# Patient Record
Sex: MALE | Race: WHITE | NOT HISPANIC OR LATINO
[De-identification: names, ages, dates, MRNs, and addresses within clinical notes are randomized per-mention and may not be internally consistent; named-entity substitution may affect disease eponyms.]

---
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
## 2022-12-29 ENCOUNTER — NON-APPOINTMENT (OUTPATIENT)
Age: 22
End: 2022-12-29

## 2023-01-18 ENCOUNTER — NON-APPOINTMENT (OUTPATIENT)
Age: 23
End: 2023-01-18

## 2023-01-19 ENCOUNTER — NON-APPOINTMENT (OUTPATIENT)
Age: 23
End: 2023-01-19

## 2023-01-19 ENCOUNTER — APPOINTMENT (OUTPATIENT)
Dept: FAMILY MEDICINE | Facility: CLINIC | Age: 23
End: 2023-01-19
Payer: MEDICAID

## 2023-01-19 VITALS
OXYGEN SATURATION: 98 % | TEMPERATURE: 97.6 F | WEIGHT: 182 LBS | SYSTOLIC BLOOD PRESSURE: 124 MMHG | BODY MASS INDEX: 24.65 KG/M2 | DIASTOLIC BLOOD PRESSURE: 80 MMHG | HEART RATE: 75 BPM | HEIGHT: 72 IN

## 2023-01-19 DIAGNOSIS — Z23 ENCOUNTER FOR IMMUNIZATION: ICD-10-CM

## 2023-01-19 DIAGNOSIS — F51.02 ADJUSTMENT INSOMNIA: ICD-10-CM

## 2023-01-19 DIAGNOSIS — F41.9 ANXIETY DISORDER, UNSPECIFIED: ICD-10-CM

## 2023-01-19 PROCEDURE — G0008: CPT

## 2023-01-19 PROCEDURE — 99203 OFFICE O/P NEW LOW 30 MIN: CPT | Mod: 25

## 2023-01-19 PROCEDURE — 90686 IIV4 VACC NO PRSV 0.5 ML IM: CPT

## 2023-01-19 NOTE — HISTORY OF PRESENT ILLNESS
[FreeTextEntry1] : Leaving pediatrician [de-identified] : Patient is a beulah in college.  Has some anxiety issues speaks to a therapist weekly has been on Zoloft in the past which made his anxiety worse.  He is able to deal with it he is unhappy with his roommates college they partied too much he cannot sleep talked about Benadryl and or melatonin.  He sleeps 4 hours a night at college and 8 hours a night when at home.  He is locked into a lease until May\par \par Weight lifts several times a week encouraged to incorporate aerobic training\par \par Does not smoke drinks rarely has a steady girlfriend monogamous no STD worries\par \par Uses creatine and protein shakes no steroids advised to keep his protein to less than 60 g a day

## 2023-01-19 NOTE — HEALTH RISK ASSESSMENT
[Excellent] : ~his/her~ current health as excellent [Good] : ~his/her~  mood as  good [Never] : Never [Yes] : Yes [No falls in past year] : Patient reported no falls in the past year [PHQ-2 Negative - No further assessment needed] : PHQ-2 Negative - No further assessment needed [de-identified] : Less than once a week [Change in mental status noted] : No change in mental status noted [Student] : student [Single] : single [Fully functional (bathing, dressing, toileting, transferring, walking, feeding)] : Fully functional (bathing, dressing, toileting, transferring, walking, feeding) [Fully functional (using the telephone, shopping, preparing meals, housekeeping, doing laundry, using] : Fully functional and needs no help or supervision to perform IADLs (using the telephone, shopping, preparing meals, housekeeping, doing laundry, using transportation, managing medications and managing finances) [Reports changes in hearing] : Reports no changes in hearing [Seat Belt] :  uses seat belt

## 2023-01-19 NOTE — ASSESSMENT
[FreeTextEntry1] : Well male exercise encouraged continue with online therapist flu shot given see me as needed

## 2023-01-19 NOTE — PHYSICAL EXAM
[No Edema] : there was no peripheral edema [No Hernias] : no hernias [Normal] : soft, non-tender, non-distended, no masses palpated, no HSM and normal bowel sounds [Penis Abnormality] : normal circumcised penis [Testes Mass (___cm)] : there were no testicular masses

## 2023-01-20 ENCOUNTER — MED ADMIN CHARGE (OUTPATIENT)
Age: 23
End: 2023-01-20

## 2023-01-21 LAB
ALBUMIN SERPL ELPH-MCNC: 4.9 G/DL
ALP BLD-CCNC: 106 U/L
ALT SERPL-CCNC: 39 U/L
ANION GAP SERPL CALC-SCNC: 12 MMOL/L
AST SERPL-CCNC: 42 U/L
BILIRUB SERPL-MCNC: 0.4 MG/DL
BUN SERPL-MCNC: 22 MG/DL
CALCIUM SERPL-MCNC: 9.9 MG/DL
CHLORIDE SERPL-SCNC: 103 MMOL/L
CHOLEST SERPL-MCNC: 164 MG/DL
CO2 SERPL-SCNC: 27 MMOL/L
CREAT SERPL-MCNC: 0.98 MG/DL
EGFR: 113 ML/MIN/1.73M2
GLUCOSE SERPL-MCNC: 82 MG/DL
HDLC SERPL-MCNC: 52 MG/DL
LDLC SERPL CALC-MCNC: 98 MG/DL
NONHDLC SERPL-MCNC: 112 MG/DL
POTASSIUM SERPL-SCNC: 4.2 MMOL/L
PROT SERPL-MCNC: 7.2 G/DL
SODIUM SERPL-SCNC: 142 MMOL/L
TRIGL SERPL-MCNC: 69 MG/DL
TSH SERPL-ACNC: 1.42 UIU/ML

## 2024-01-19 ENCOUNTER — APPOINTMENT (OUTPATIENT)
Dept: FAMILY MEDICINE | Facility: CLINIC | Age: 24
End: 2024-01-19
Payer: MEDICAID

## 2024-01-19 VITALS
HEART RATE: 79 BPM | SYSTOLIC BLOOD PRESSURE: 110 MMHG | TEMPERATURE: 97.9 F | OXYGEN SATURATION: 98 % | DIASTOLIC BLOOD PRESSURE: 60 MMHG | HEIGHT: 72 IN | WEIGHT: 204 LBS | BODY MASS INDEX: 27.63 KG/M2

## 2024-01-19 DIAGNOSIS — Z00.00 ENCOUNTER FOR GENERAL ADULT MEDICAL EXAMINATION W/OUT ABNORMAL FINDINGS: ICD-10-CM

## 2024-01-19 PROCEDURE — 90686 IIV4 VACC NO PRSV 0.5 ML IM: CPT

## 2024-01-19 PROCEDURE — G0008: CPT

## 2024-01-19 PROCEDURE — 99395 PREV VISIT EST AGE 18-39: CPT | Mod: 25

## 2024-01-19 RX ORDER — KETOCONAZOLE 20.5 MG/ML
2 SHAMPOO, SUSPENSION TOPICAL DAILY
Qty: 2 | Refills: 3 | Status: ACTIVE | COMMUNITY
Start: 2024-01-19 | End: 1900-01-01

## 2024-01-19 NOTE — ASSESSMENT
[FreeTextEntry1] : Ketoconazole for dandruff  Recheck LFTs AST was 42 last year  Sleeping much better with new college roommates

## 2024-01-19 NOTE — HEALTH RISK ASSESSMENT
[Excellent] : ~his/her~  mood as  excellent [No] : No [PHQ-2 Negative - No further assessment needed] : PHQ-2 Negative - No further assessment needed [Change in mental status noted] : No change in mental status noted [Fully functional (bathing, dressing, toileting, transferring, walking, feeding)] : Fully functional (bathing, dressing, toileting, transferring, walking, feeding) [Fully functional (using the telephone, shopping, preparing meals, housekeeping, doing laundry, using] : Fully functional and needs no help or supervision to perform IADLs (using the telephone, shopping, preparing meals, housekeeping, doing laundry, using transportation, managing medications and managing finances)

## 2024-01-19 NOTE — PHYSICAL EXAM
[Normal] : soft, non-tender, non-distended, no masses palpated, no HSM and normal bowel sounds [Penis Abnormality] : normal circumcised penis [Testes Mass (___cm)] : there were no testicular masses [de-identified] : Dandruff and slight seborrheic dermatitis of face

## 2024-01-19 NOTE — HISTORY OF PRESENT ILLNESS
[de-identified] : Here for checkup.  Has no complaints.  Exercises daily.  Uses creatine 2 g a day no other supplements no testosterone.  Does not drink does not smoke  Severe dandruff over-the-counter shampoos ineffective  Sexually active with 1 partner no STD issues  Senior at college in [employer]

## 2024-01-20 LAB
ALBUMIN SERPL ELPH-MCNC: 4.6 G/DL
ALP BLD-CCNC: 118 U/L
ALT SERPL-CCNC: 38 U/L
ANION GAP SERPL CALC-SCNC: 12 MMOL/L
AST SERPL-CCNC: 35 U/L
BILIRUB SERPL-MCNC: 0.3 MG/DL
BUN SERPL-MCNC: 25 MG/DL
CALCIUM SERPL-MCNC: 9.5 MG/DL
CHLORIDE SERPL-SCNC: 107 MMOL/L
CO2 SERPL-SCNC: 26 MMOL/L
CREAT SERPL-MCNC: 1 MG/DL
EGFR: 110 ML/MIN/1.73M2
GLUCOSE SERPL-MCNC: 59 MG/DL
POTASSIUM SERPL-SCNC: 4.3 MMOL/L
PROT SERPL-MCNC: 6.4 G/DL
SODIUM SERPL-SCNC: 145 MMOL/L

## 2024-09-26 ENCOUNTER — APPOINTMENT (OUTPATIENT)
Dept: FAMILY MEDICINE | Facility: CLINIC | Age: 24
End: 2024-09-26
Payer: MEDICAID

## 2024-09-26 VITALS
TEMPERATURE: 96.7 F | HEIGHT: 72 IN | DIASTOLIC BLOOD PRESSURE: 64 MMHG | BODY MASS INDEX: 27.5 KG/M2 | RESPIRATION RATE: 16 BRPM | WEIGHT: 203 LBS | SYSTOLIC BLOOD PRESSURE: 108 MMHG | HEART RATE: 62 BPM | OXYGEN SATURATION: 98 %

## 2024-09-26 DIAGNOSIS — S46.319A STRAIN OF MUSCLE, FASCIA AND TENDON OF TRICEPS, UNSPECIFIED ARM, INITIAL ENCOUNTER: ICD-10-CM

## 2024-09-26 DIAGNOSIS — Z78.9 OTHER SPECIFIED HEALTH STATUS: ICD-10-CM

## 2024-09-26 PROCEDURE — 99213 OFFICE O/P EST LOW 20 MIN: CPT

## 2024-09-26 NOTE — HISTORY OF PRESENT ILLNESS
[FreeTextEntry8] : 24M here for R triceps pain starting yesterday. Patient was finishing a deadlift, and upon setting the barbell down noticed he had pain in his R tricep. He continued his sets and other exercises (rows, pulldowns), and this morning woke up in more pain than yesterday. He is able to perform his daily activities without issue, but has pain in his R tricep when he lifts up his R arm above behind his head. Otherwise no weakness or concern.

## 2024-09-26 NOTE — PHYSICAL EXAM
[No Acute Distress] : no acute distress [Well Nourished] : well nourished [Well Developed] : well developed [Normal] : soft, non-tender, non-distended, no masses palpated, no HSM and normal bowel sounds [Normal Posterior Cervical Nodes] : no posterior cervical lymphadenopathy [Normal Anterior Cervical Nodes] : no anterior cervical lymphadenopathy [No Rash] : no rash [Normal Gait] : normal gait [Normal Affect] : the affect was normal [Normal Insight/Judgement] : insight and judgment were intact [de-identified] : Tenderness to R mid triceps. Negative Neers, Felipe, Empty can. No rotator cuff tenderness. No elbow tenderness. Full strength and ROM.

## 2024-09-26 NOTE — ASSESSMENT
[FreeTextEntry1] : .  Nonconcerning hx and physical exam likely triceps strain. No weakness of the rotator cuff appreciated.  Advised stretches as tolerated, ice, NSAIDs, tylenol. Rest without lifting for 1 week then gradual return with lower weights as strength allows.  Pt requesting Orthopedic surgery referral, will send.

## 2024-09-30 ENCOUNTER — APPOINTMENT (OUTPATIENT)
Dept: ORTHOPEDIC SURGERY | Facility: CLINIC | Age: 24
End: 2024-09-30
Payer: MEDICAID

## 2024-09-30 VITALS — HEIGHT: 72 IN | BODY MASS INDEX: 27.5 KG/M2 | WEIGHT: 203 LBS

## 2024-09-30 DIAGNOSIS — S46.311A STRAIN OF MUSCLE, FASCIA AND TENDON OF TRICEPS, RIGHT ARM, INITIAL ENCOUNTER: ICD-10-CM

## 2024-09-30 DIAGNOSIS — S29.012A STRAIN OF MUSCLE AND TENDON OF BACK WALL OF THORAX, INITIAL ENCOUNTER: ICD-10-CM

## 2024-09-30 PROCEDURE — 73030 X-RAY EXAM OF SHOULDER: CPT | Mod: 26,RT

## 2024-09-30 PROCEDURE — 99204 OFFICE O/P NEW MOD 45 MIN: CPT | Mod: 25

## 2024-09-30 RX ORDER — MELOXICAM 15 MG/1
15 TABLET ORAL
Qty: 21 | Refills: 0 | Status: ACTIVE | COMMUNITY
Start: 2024-09-30 | End: 1900-01-01

## 2024-09-30 NOTE — DISCUSSION/SUMMARY
[de-identified] : We had a thorough discussion regarding the nature of his pain, the pathophysiology, as well as all treatment options. I discussed operative and non-operative treatment modalities. Patient's presentation is consistent with latissimus dorsi strain. Conservative measures of treatment include rest until asymptomatic, activity avoidance, application to ice to the area 2-3x daily for 20 minutes, with gradual return to activities. Patient was given prescription of formal physical therapy that he will perform 2x/wk for 6-8 wks. A prescription of Meloxicam was given to be taken as directed with food to prevent GI upset, if occurs pt to D/C and call us at that time. All questions were answered and the patient verbalized understanding. The patient is in agreement with this treatment plan. Patient will follow up in 4 wks for repeat clinical assessment.

## 2024-09-30 NOTE — ADDENDUM
[FreeTextEntry1] : Documented by Daysi Mathew acting as a scribe for Dr. Chaves and Gautam Palmer PA-C on 09/30/2024. All medical record entries made by the Scribe were at my, Dr. Chaves, and Gautam Palmer's, direction and personally dictated by me on 09/30/2024. I have reviewed the chart and agree that the record accurately reflects my personal performance of the history, physical exam, procedure and imaging.

## 2024-09-30 NOTE — CONSULT LETTER
[Dear  ___] : Dear  [unfilled], [Consult Letter:] : I had the pleasure of evaluating your patient, [unfilled]. [Please see my note below.] : Please see my note below. [Sincerely,] : Sincerely, [FreeTextEntry3] : Dr. Celestine Chaves

## 2024-09-30 NOTE — HISTORY OF PRESENT ILLNESS
[de-identified] : CORTEZ is a 24 year male here today for initial visit due to R upper extremity injury. Patient states he was deadlifting and felt weird pain in his triceps after getting up from the rep. He then stopped because he felt he injured himself. Injury occurred on 09/25/2024. Says pain has improved but he is having difficulty with range of motion overhead. Denies shoulder pain. Denies difficulties breathing. Denies numbness/tingling.

## 2024-09-30 NOTE — PHYSICAL EXAM
[de-identified] : General: Well appearing, no acute distress Neurologic: A&Ox3, No focal deficits Head: NCAT without abrasions, lacerations, or ecchymosis to head, face, or scalp Eyes: No scleral icterus, no gross abnormalities Respiratory: Equal chest wall expansion bilaterally, no accessory muscle use Lymphatic: No lymphadenopathy palpated Skin: Warm and dry Psychiatric: Normal mood and affect   Examination of the right shoulder reveals no obvious deformity.  There is no evidence of muscle atrophy.  The skin is intact.  There is no swelling, erythema, or ecchymosis. Tender over trifurcation of the proximal triceps. The patient is nontender to palpation throughout the right shoulder including the anterior aspect of the shoulder near the bicipital groove, a.c. joint, trapezius, rhomboids, infraspinatus, and posterior shoulder.  The patient's active range of motion is as follows: Forward flexion to 175 degrees external rotation to 60 degrees and internal rotation to T7.  The patient has 5 out of 5 strength to forward flexion with pronation, internal and external rotation.  The patient has negative Felipe and Neer's tests.  There is no pain with cross body abduction testing.  There is a negative a.c. compression test and no sulcus sign present.  There is a negative liftoff test and negative yergason test.  The compartments of the arm are soft and nontender without evidence of DVT or compartment syndrome.  The patient is grossly neurovascularly intact distally. [de-identified] : The following radiographs were ordered and read by me during this patient's visit. I reviewed each radiograph in detail with the patient and discussed the findings as highlighted below. 4 views of the right shoulder were obtained today that show no fracture, dislocation. There is no degenerative change seen. There is no malalignment. No obvious osseous abnormality. Otherwise unremarkable.

## 2024-09-30 NOTE — HISTORY OF PRESENT ILLNESS
[de-identified] : CORTEZ is a 24 year male here today for initial visit due to R upper extremity injury. Patient states he was deadlifting and felt weird pain in his triceps after getting up from the rep. He then stopped because he felt he injured himself. Injury occurred on 09/25/2024. Says pain has improved but he is having difficulty with range of motion overhead. Denies shoulder pain. Denies difficulties breathing. Denies numbness/tingling.

## 2024-09-30 NOTE — DISCUSSION/SUMMARY
[de-identified] : We had a thorough discussion regarding the nature of his pain, the pathophysiology, as well as all treatment options. I discussed operative and non-operative treatment modalities. Patient's presentation is consistent with latissimus dorsi strain. Conservative measures of treatment include rest until asymptomatic, activity avoidance, application to ice to the area 2-3x daily for 20 minutes, with gradual return to activities. Patient was given prescription of formal physical therapy that he will perform 2x/wk for 6-8 wks. A prescription of Meloxicam was given to be taken as directed with food to prevent GI upset, if occurs pt to D/C and call us at that time. All questions were answered and the patient verbalized understanding. The patient is in agreement with this treatment plan. Patient will follow up in 4 wks for repeat clinical assessment.

## 2024-09-30 NOTE — PHYSICAL EXAM
[de-identified] : General: Well appearing, no acute distress Neurologic: A&Ox3, No focal deficits Head: NCAT without abrasions, lacerations, or ecchymosis to head, face, or scalp Eyes: No scleral icterus, no gross abnormalities Respiratory: Equal chest wall expansion bilaterally, no accessory muscle use Lymphatic: No lymphadenopathy palpated Skin: Warm and dry Psychiatric: Normal mood and affect   Examination of the right shoulder reveals no obvious deformity.  There is no evidence of muscle atrophy.  The skin is intact.  There is no swelling, erythema, or ecchymosis. Tender over trifurcation of the proximal triceps. The patient is nontender to palpation throughout the right shoulder including the anterior aspect of the shoulder near the bicipital groove, a.c. joint, trapezius, rhomboids, infraspinatus, and posterior shoulder.  The patient's active range of motion is as follows: Forward flexion to 175 degrees external rotation to 60 degrees and internal rotation to T7.  The patient has 5 out of 5 strength to forward flexion with pronation, internal and external rotation.  The patient has negative Felipe and Neer's tests.  There is no pain with cross body abduction testing.  There is a negative a.c. compression test and no sulcus sign present.  There is a negative liftoff test and negative yergason test.  The compartments of the arm are soft and nontender without evidence of DVT or compartment syndrome.  The patient is grossly neurovascularly intact distally. [de-identified] : The following radiographs were ordered and read by me during this patient's visit. I reviewed each radiograph in detail with the patient and discussed the findings as highlighted below. 4 views of the right shoulder were obtained today that show no fracture, dislocation. There is no degenerative change seen. There is no malalignment. No obvious osseous abnormality. Otherwise unremarkable.

## 2024-12-19 ENCOUNTER — APPOINTMENT (OUTPATIENT)
Dept: FAMILY MEDICINE | Facility: CLINIC | Age: 24
End: 2024-12-19
Payer: MEDICAID

## 2024-12-19 VITALS
WEIGHT: 207 LBS | HEIGHT: 72 IN | DIASTOLIC BLOOD PRESSURE: 68 MMHG | TEMPERATURE: 98 F | OXYGEN SATURATION: 98 % | BODY MASS INDEX: 28.04 KG/M2 | HEART RATE: 71 BPM | SYSTOLIC BLOOD PRESSURE: 110 MMHG

## 2024-12-19 DIAGNOSIS — M54.42 LUMBAGO WITH SCIATICA, LEFT SIDE: ICD-10-CM

## 2024-12-19 PROCEDURE — 99214 OFFICE O/P EST MOD 30 MIN: CPT

## 2024-12-19 RX ORDER — METHYLPREDNISOLONE 4 MG/1
4 TABLET ORAL
Qty: 1 | Refills: 1 | Status: ACTIVE | COMMUNITY
Start: 2024-12-19 | End: 1900-01-01

## 2024-12-19 RX ORDER — CYCLOBENZAPRINE HYDROCHLORIDE 10 MG/1
10 TABLET, FILM COATED ORAL
Qty: 1 | Refills: 0 | Status: ACTIVE | COMMUNITY
Start: 2024-12-19 | End: 1900-01-01

## 2024-12-26 ENCOUNTER — APPOINTMENT (OUTPATIENT)
Dept: MRI IMAGING | Facility: HOSPITAL | Age: 24
End: 2024-12-26

## 2025-01-09 DIAGNOSIS — M54.42 LUMBAGO WITH SCIATICA, LEFT SIDE: ICD-10-CM

## 2025-01-15 ENCOUNTER — NON-APPOINTMENT (OUTPATIENT)
Age: 25
End: 2025-01-15

## 2025-01-15 ENCOUNTER — APPOINTMENT (OUTPATIENT)
Dept: FAMILY MEDICINE | Facility: CLINIC | Age: 25
End: 2025-01-15
Payer: MEDICAID

## 2025-01-15 VITALS
DIASTOLIC BLOOD PRESSURE: 74 MMHG | OXYGEN SATURATION: 99 % | HEART RATE: 80 BPM | SYSTOLIC BLOOD PRESSURE: 114 MMHG | BODY MASS INDEX: 28.31 KG/M2 | WEIGHT: 209 LBS | TEMPERATURE: 97.8 F | HEIGHT: 72 IN

## 2025-01-15 DIAGNOSIS — R29.898 OTHER SYMPTOMS AND SIGNS INVOLVING THE MUSCULOSKELETAL SYSTEM: ICD-10-CM

## 2025-01-15 DIAGNOSIS — M53.86 OTHER SPECIFIED DORSOPATHIES, LUMBAR REGION: ICD-10-CM

## 2025-01-15 DIAGNOSIS — S29.012A STRAIN OF MUSCLE AND TENDON OF BACK WALL OF THORAX, INITIAL ENCOUNTER: ICD-10-CM

## 2025-01-15 DIAGNOSIS — S46.311A STRAIN OF MUSCLE, FASCIA AND TENDON OF TRICEPS, RIGHT ARM, INITIAL ENCOUNTER: ICD-10-CM

## 2025-01-15 DIAGNOSIS — S46.319A STRAIN OF MUSCLE, FASCIA AND TENDON OF TRICEPS, UNSPECIFIED ARM, INITIAL ENCOUNTER: ICD-10-CM

## 2025-01-15 PROCEDURE — 99213 OFFICE O/P EST LOW 20 MIN: CPT

## 2025-01-20 ENCOUNTER — OUTPATIENT (OUTPATIENT)
Dept: OUTPATIENT SERVICES | Facility: HOSPITAL | Age: 25
LOS: 1 days | End: 2025-01-20
Payer: MEDICAID

## 2025-01-20 ENCOUNTER — APPOINTMENT (OUTPATIENT)
Dept: RADIOLOGY | Facility: CLINIC | Age: 25
End: 2025-01-20
Payer: MEDICAID

## 2025-01-20 DIAGNOSIS — R29.898 OTHER SYMPTOMS AND SIGNS INVOLVING THE MUSCULOSKELETAL SYSTEM: ICD-10-CM

## 2025-01-20 DIAGNOSIS — M53.86 OTHER SPECIFIED DORSOPATHIES, LUMBAR REGION: ICD-10-CM

## 2025-01-20 PROCEDURE — 72100 X-RAY EXAM L-S SPINE 2/3 VWS: CPT

## 2025-01-20 PROCEDURE — 72100 X-RAY EXAM L-S SPINE 2/3 VWS: CPT | Mod: 26

## 2025-02-03 ENCOUNTER — TRANSCRIPTION ENCOUNTER (OUTPATIENT)
Age: 25
End: 2025-02-03

## 2025-02-05 ENCOUNTER — APPOINTMENT (OUTPATIENT)
Dept: PHYSICAL MEDICINE AND REHAB | Facility: CLINIC | Age: 25
End: 2025-02-05
Payer: MEDICAID

## 2025-02-05 VITALS
BODY MASS INDEX: 27.77 KG/M2 | DIASTOLIC BLOOD PRESSURE: 74 MMHG | HEIGHT: 72 IN | SYSTOLIC BLOOD PRESSURE: 127 MMHG | WEIGHT: 205 LBS | RESPIRATION RATE: 12 BRPM | HEART RATE: 62 BPM

## 2025-02-05 DIAGNOSIS — M53.86 OTHER SPECIFIED DORSOPATHIES, LUMBAR REGION: ICD-10-CM

## 2025-02-05 DIAGNOSIS — R29.898 OTHER SYMPTOMS AND SIGNS INVOLVING THE MUSCULOSKELETAL SYSTEM: ICD-10-CM

## 2025-02-05 DIAGNOSIS — M54.42 LUMBAGO WITH SCIATICA, LEFT SIDE: ICD-10-CM

## 2025-02-05 DIAGNOSIS — Z78.9 OTHER SPECIFIED HEALTH STATUS: ICD-10-CM

## 2025-02-05 PROCEDURE — 99204 OFFICE O/P NEW MOD 45 MIN: CPT

## 2025-02-05 RX ORDER — DICLOFENAC SODIUM 10 MG/G
1 GEL TOPICAL 3 TIMES DAILY
Qty: 3 | Refills: 2 | Status: ACTIVE | COMMUNITY
Start: 2025-02-05 | End: 1900-01-01

## 2025-02-19 ENCOUNTER — APPOINTMENT (OUTPATIENT)
Dept: PHYSICAL MEDICINE AND REHAB | Facility: CLINIC | Age: 25
End: 2025-02-19
Payer: MEDICAID

## 2025-02-19 VITALS
RESPIRATION RATE: 14 BRPM | HEART RATE: 65 BPM | DIASTOLIC BLOOD PRESSURE: 75 MMHG | WEIGHT: 205 LBS | SYSTOLIC BLOOD PRESSURE: 115 MMHG | HEIGHT: 72 IN | BODY MASS INDEX: 27.77 KG/M2

## 2025-02-19 DIAGNOSIS — M54.42 LUMBAGO WITH SCIATICA, LEFT SIDE: ICD-10-CM

## 2025-02-19 DIAGNOSIS — M54.16 RADICULOPATHY, LUMBAR REGION: ICD-10-CM

## 2025-02-19 PROCEDURE — 99214 OFFICE O/P EST MOD 30 MIN: CPT

## 2025-02-26 ENCOUNTER — APPOINTMENT (OUTPATIENT)
Dept: PHYSICAL MEDICINE AND REHAB | Facility: CLINIC | Age: 25
End: 2025-02-26
Payer: MEDICAID

## 2025-02-26 PROCEDURE — 95886 MUSC TEST DONE W/N TEST COMP: CPT | Mod: LT

## 2025-02-26 PROCEDURE — 95908 NRV CNDJ TST 3-4 STUDIES: CPT

## 2025-02-27 ENCOUNTER — APPOINTMENT (OUTPATIENT)
Dept: PHYSICAL MEDICINE AND REHAB | Facility: CLINIC | Age: 25
End: 2025-02-27
Payer: MEDICAID

## 2025-02-27 DIAGNOSIS — R20.2 ANESTHESIA OF SKIN: ICD-10-CM

## 2025-02-27 DIAGNOSIS — R29.898 OTHER SYMPTOMS AND SIGNS INVOLVING THE MUSCULOSKELETAL SYSTEM: ICD-10-CM

## 2025-02-27 DIAGNOSIS — R20.0 ANESTHESIA OF SKIN: ICD-10-CM

## 2025-02-27 PROCEDURE — 95907 NVR CNDJ TST 1-2 STUDIES: CPT

## 2025-02-27 PROCEDURE — 95885 MUSC TST DONE W/NERV TST LIM: CPT

## 2025-03-28 DIAGNOSIS — F32.2 MAJOR DEPRESSIVE DISORDER, SINGLE EPISODE, SEVERE W/OUT PSYCHOTIC FEATURES: ICD-10-CM

## 2025-04-02 ENCOUNTER — APPOINTMENT (OUTPATIENT)
Dept: ORTHOPEDIC SURGERY | Facility: CLINIC | Age: 25
End: 2025-04-02

## 2025-04-21 DIAGNOSIS — M25.569 PAIN IN UNSPECIFIED KNEE: ICD-10-CM

## 2025-05-14 ENCOUNTER — APPOINTMENT (OUTPATIENT)
Dept: FAMILY MEDICINE | Facility: CLINIC | Age: 25
End: 2025-05-14
Payer: MEDICAID

## 2025-05-14 VITALS
DIASTOLIC BLOOD PRESSURE: 70 MMHG | BODY MASS INDEX: 28.04 KG/M2 | HEIGHT: 72 IN | HEART RATE: 73 BPM | RESPIRATION RATE: 14 BRPM | SYSTOLIC BLOOD PRESSURE: 100 MMHG | TEMPERATURE: 97.2 F | WEIGHT: 207 LBS | OXYGEN SATURATION: 98 %

## 2025-05-14 DIAGNOSIS — M54.16 RADICULOPATHY, LUMBAR REGION: ICD-10-CM

## 2025-05-14 DIAGNOSIS — F32.2 MAJOR DEPRESSIVE DISORDER, SINGLE EPISODE, SEVERE W/OUT PSYCHOTIC FEATURES: ICD-10-CM

## 2025-05-14 PROCEDURE — 99214 OFFICE O/P EST MOD 30 MIN: CPT | Mod: 25

## 2025-05-14 PROCEDURE — 99395 PREV VISIT EST AGE 18-39: CPT

## 2025-05-15 LAB
ALBUMIN SERPL ELPH-MCNC: 4.6 G/DL
ALP BLD-CCNC: 116 U/L
ALT SERPL-CCNC: 31 U/L
ANION GAP SERPL CALC-SCNC: 12 MMOL/L
AST SERPL-CCNC: 29 U/L
BASOPHILS # BLD AUTO: 0.04 K/UL
BASOPHILS NFR BLD AUTO: 0.8 %
BILIRUB SERPL-MCNC: 0.5 MG/DL
BUN SERPL-MCNC: 22 MG/DL
C TRACH RRNA SPEC QL NAA+PROBE: NOT DETECTED
CALCIUM SERPL-MCNC: 9.4 MG/DL
CHLORIDE SERPL-SCNC: 105 MMOL/L
CHOLEST SERPL-MCNC: 168 MG/DL
CO2 SERPL-SCNC: 28 MMOL/L
CREAT SERPL-MCNC: 1.2 MG/DL
EGFRCR SERPLBLD CKD-EPI 2021: 88 ML/MIN/1.73M2
EOSINOPHIL # BLD AUTO: 0.12 K/UL
EOSINOPHIL NFR BLD AUTO: 2.4 %
GLUCOSE SERPL-MCNC: 89 MG/DL
HCT VFR BLD CALC: 45.6 %
HDLC SERPL-MCNC: 44 MG/DL
HGB BLD-MCNC: 15.1 G/DL
IMM GRANULOCYTES NFR BLD AUTO: 0 %
LDLC SERPL-MCNC: 111 MG/DL
LYMPHOCYTES # BLD AUTO: 1.84 K/UL
LYMPHOCYTES NFR BLD AUTO: 36.9 %
MAN DIFF?: NORMAL
MCHC RBC-ENTMCNC: 30 PG
MCHC RBC-ENTMCNC: 33.1 G/DL
MCV RBC AUTO: 90.7 FL
MONOCYTES # BLD AUTO: 0.45 K/UL
MONOCYTES NFR BLD AUTO: 9 %
N GONORRHOEA RRNA SPEC QL NAA+PROBE: NOT DETECTED
NEUTROPHILS # BLD AUTO: 2.53 K/UL
NEUTROPHILS NFR BLD AUTO: 50.9 %
NONHDLC SERPL-MCNC: 124 MG/DL
PLATELET # BLD AUTO: 229 K/UL
POTASSIUM SERPL-SCNC: 4.5 MMOL/L
PROT SERPL-MCNC: 6.8 G/DL
RBC # BLD: 5.03 M/UL
RBC # FLD: 12.3 %
SODIUM SERPL-SCNC: 145 MMOL/L
SOURCE AMPLIFICATION: NORMAL
TRIGL SERPL-MCNC: 66 MG/DL
WBC # FLD AUTO: 4.98 K/UL

## 2025-05-23 PROBLEM — M25.532 LEFT WRIST PAIN: Status: ACTIVE | Noted: 2025-05-23

## 2025-05-27 ENCOUNTER — APPOINTMENT (OUTPATIENT)
Dept: ORTHOPEDIC SURGERY | Facility: CLINIC | Age: 25
End: 2025-05-27
Payer: MEDICAID

## 2025-05-27 VITALS — WEIGHT: 209 LBS | BODY MASS INDEX: 28.31 KG/M2 | HEIGHT: 72 IN

## 2025-05-27 DIAGNOSIS — M25.532 PAIN IN LEFT WRIST: ICD-10-CM

## 2025-05-27 DIAGNOSIS — S69.82XA OTHER SPECIFIED INJURIES OF LEFT WRIST, HAND AND FINGER(S), INITIAL ENCOUNTER: ICD-10-CM

## 2025-05-27 DIAGNOSIS — M25.432 EFFUSION, LEFT WRIST: ICD-10-CM

## 2025-05-27 PROCEDURE — 73110 X-RAY EXAM OF WRIST: CPT | Mod: LT

## 2025-05-27 PROCEDURE — 99204 OFFICE O/P NEW MOD 45 MIN: CPT | Mod: 25

## 2025-05-27 RX ORDER — MELOXICAM 15 MG/1
15 TABLET ORAL DAILY
Qty: 15 | Refills: 1 | Status: ACTIVE | COMMUNITY
Start: 2025-05-27 | End: 1900-01-01

## 2025-07-08 ENCOUNTER — APPOINTMENT (OUTPATIENT)
Dept: ORTHOPEDIC SURGERY | Facility: CLINIC | Age: 25
End: 2025-07-08
Payer: COMMERCIAL

## 2025-07-08 PROBLEM — M77.11 LATERAL EPICONDYLITIS OF RIGHT ELBOW: Status: ACTIVE | Noted: 2025-07-08

## 2025-07-08 PROCEDURE — 73080 X-RAY EXAM OF ELBOW: CPT | Mod: RT

## 2025-07-08 PROCEDURE — 99204 OFFICE O/P NEW MOD 45 MIN: CPT

## 2025-07-08 RX ORDER — MELOXICAM 15 MG/1
15 TABLET ORAL
Qty: 30 | Refills: 2 | Status: ACTIVE | COMMUNITY
Start: 2025-07-08 | End: 1900-01-01

## 2025-07-08 RX ORDER — DICLOFENAC SODIUM 10 MG/G
1 GEL TOPICAL
Qty: 1 | Refills: 0 | Status: ACTIVE | COMMUNITY
Start: 2025-07-08 | End: 1900-01-01

## 2025-08-19 ENCOUNTER — APPOINTMENT (OUTPATIENT)
Dept: ORTHOPEDIC SURGERY | Facility: CLINIC | Age: 25
End: 2025-08-19